# Patient Record
Sex: FEMALE
[De-identification: names, ages, dates, MRNs, and addresses within clinical notes are randomized per-mention and may not be internally consistent; named-entity substitution may affect disease eponyms.]

---

## 2018-11-07 NOTE — MRI
MRI RIGHT SHOULDER:

 

11/07/2018

 

PROVIDED CLINICAL HISTORY:

Right shoulder pain, status post injury.

 

FINDINGS:

Evaluation is limited by patient motion.

 

There is full-thickness partial retracted tearing involving the anterior supraspinatus tendon, distal
ly.  There is retraction to about the level of the acromion.  Partial-thickness under-surface tearing
 involving the distal fibers of infraspinatus cannot be excluded.  Partial-thickness interstitial tea
ring involving the cranial fibers of the subscapularis is suspected.  The teres minor appears intact.
  There is medial subluxation of the longhead biceps tendon, suggesting bicipital sling injury.  No f
rank dislocation.

 

There is a small glenohumeral joint effusion.  The glenoid labrum and glenohumeral articular cartilag
e are not optimally evaluated on the basis of this examination.

 

Susceptibility artifact is seen about the shoulder, suggesting prior surgical change.  Rotator cuff m
uscular volume appears preserved.  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
gnificant mass effect on the subjacent supraspinatus apparent.

 

There is greater than physiologic subacromial subdeltoid bursal fluid.

 

IMPRESSION:

1.  Limited study due to patient motion.  Full-thickness, partial-width tear of the supraspinatus ten
don with retraction.

 

2.  Partial-thickness tears of the infraspinatus and subscapularis are suspected.

 

3.  Medial subluxation of the longhead biceps tendon may reflect bicipital sling injury.

 

4.  Acromioclavicular joint osteoarthrosis.

 

POS: [facility]

## 2018-11-09 NOTE — RAD
RIGHT SHOULDER THREE VIEWS:

 

Indication: Pain, fall. 

 

FINDINGS: 

There is osteoarthritis without fracture or dislocation. 

 

IMPRESSION: 

No acute osseous abnormality right shoulder. 

 

POS: [facility]

## 2018-11-09 NOTE — RAD
3 VIEWS RIGHT WRIST:

 

Date:  11/09/18 

 

INDICATION:

Right wrist pain after fall. 

 

COMPARISON:  

None. 

 

FINDINGS:

There is diffuse osteopenia. No definite acute fracture or subluxation is evident. Carpal alignment a
ppears within normal limits. 

 

IMPRESSION: 

No acute osseous abnormality. 

 

 

POS: [facility]

## 2018-11-09 NOTE — RAD
2 VIEWS RIGHT FOREARM:

 

Date:  11/09/18 

 

INDICATION:

History of fall with right forearm pain. 

 

COMPARISON:  

None. 

 

FINDINGS/IMPRESSION: 

No acute fracture or subluxation is evident. Radiocapitellar alignment appears within normal limits. 
Soft tissues appear within normal limits. 

 

 

POS: [facility]

## 2018-12-05 NOTE — EKG
Test Reason : 

Blood Pressure : ***/*** mmHG

Vent. Rate : 065 BPM     Atrial Rate : 065 BPM

   P-R Int : 164 ms          QRS Dur : 084 ms

    QT Int : 408 ms       P-R-T Axes : 044 046 044 degrees

   QTc Int : 424 ms

 

Normal sinus rhythm

Normal ECG

When compared with ECG of 29-FEB-2016 13:47,

No significant change was found

Confirmed by ZURI BRUMFIELD ([id number]) on 12/5/2018 9:47:07 PM

 

Referred By:  JEANETTE           Confirmed By:ZURI BRUMFIELD

## 2018-12-07 NOTE — OP
DATE OF PROCEDURE:  12/07/2018



PREOPERATIVE DIAGNOSIS:  Rotator cuff tear and biceps dislocation, right shoulder.



POSTOPERATIVE DIAGNOSIS:  Rotator cuff tear and biceps dislocation, right shoulder

with leading edge of subscapularis tear. 



ASSISTANT:  Jos.



BLOOD LOSS:  Minimal.



SPECIMEN:  None.



DRAINS:  None.



COMPLICATION:  None.



TITLE OF PROCEDURE:  Right shoulder arthroscopic biceps tenotomy and arthroscopic

rotator cuff repair with arthroscopic decompression. 



DESCRIPTION OF PROCEDURE:  The patient was taken to the operating room, where

general anesthesia was induced.  The patient was placed in left lateral decubitus

position.  She received Ancef preoperatively.  Right arm was placed in traction,

prepped and draped in usual sterile fashion.  The scope was placed in the

glenohumeral joint.  The biceps tendon was unstable.  Due to the sling being

ruptured, I tagged the biceps and transected superior glenoid tubercle.  The biceps

only settled down into the groove, so I did not feel tenodesis was required.  She

has very soft bone.  I am concerned about possible iatrogenic fracture, so I left

this as a tenotomy.  Rotator cuff tear was mobilized and freshened.  It was fairly a

large tear.  You could see sutures from a previous repair. 

It ruptured the same place.  I freshened up the greater tuberosity with a latia.  I

performed an anterior and inferior acromioplasty with a latia.  Three Corkscrew

suture anchors were deployed, and sutures were passed through the rotator cuff and

tied in a very good approximation to the bleeding cancellous bone.  Shoulders were

then drained.  The portals were closed using nylon suture.  Sterile dressings were

applied. 





Job ID:  [id number]

## 2019-03-06 NOTE — RAD
CHEST ONE VIEW:

 

HISTORY:

Pain.

 

COMPARISON:

02/29/2016

 

FINDINGS:

Slight elongation of the aorta.  Normal cardiac silhouette.  Pulmonary vessels and hilum are normal. 
 Costophrenic angles are clear.  No mass.  No consolidation.  No pneumothorax or acute osseous abnorm
alities.

 

IMPRESSION:

No acute cardiopulmonary process.

 

POS: [facility]

## 2019-04-28 NOTE — RAD
Right forearm: 2 views



Indications fall with injury.



No evidence of fracture.



IMPRESSION: No acute abnormality.



Reported By: Carlos Gonzalez 

Electronically Signed:  4/28/2019 11:46 AM

## 2019-06-07 NOTE — MMO
Bilateral MAMMO Bilat Screen [facility].

 

CLINICAL HISTORY:

Patient is 79 years old and is seen for screening. The patient has the following

family history of breast cancer:  maternal aunt.  The patient has no personal

history of cancer.

 

VIEWS:

The views performed were:  bilateral craniocaudal with tomosynthesis and

bilateral mediolateral oblique with tomosynthesis.

 

FILMS COMPARED:

The present examination has been compared to prior imaging studies performed at

[facility] on 04/14/2014, 04/28/2015, 05/17/2016 and 05/18/2017.

 

MAMMOGRAM FINDINGS:

The breasts are heterogeneously dense, which could obscure a lesion on

mammography.

 

There are stable benign appearing calcifications seen in both breasts.

 

There are also vascular calcifications.

 

There are no suspicious masses, suspicious calcifications, or new areas of

architectural distortion.

 

IMPRESSION:

THERE IS NO MAMMOGRAPHIC EVIDENCE OF MALIGNANCY.

 

A ROUTINE FOLLOW-UP MAMMOGRAM IN 1 YEAR IS RECOMMENDED.

 

THE RESULTS OF THIS EXAM WERE SENT TO THE PATIENT.

 

ACR BI-RADS Category 2 - Benign finding

 

MAMMOGRAPHY NOTE:

 1. A negative mammogram report should not delay a biopsy if a dominant of

 clinically suspicious mass is present.

 2. Approximately 10% to 15% of breast cancers are not detected by

 mammography.

 3. Adenosis and dense breasts may obscure an underlying neoplasm.

## 2019-06-19 NOTE — MRI
MRI OF PELVIS PERFORMED WITHOUT CONTRAST ENHANCEMENT:

6/19/19

 

HISTORY: 

Left hip pain.

 

The SI joints are symmetric in appearance. There are no signs of any pelvic insufficiency type fractu
res. The visualized intrapelvic contents appear unremarkable. 

 

There are tendinosis changes of the hamstring tendon origins with a low grade partial tear at the rig
ht hamstring tendon origin. 

 

There are mild arthritic changes of the right hip demonstrated. 

 

Small field of view images of the left hip were performed. There is a left hip joint effusion present
. There is spur formation along the femoral head and neck junction. Some minimal edema change along t
he lateral femoral head and neck junction and some subtle subchondral marrow edema change of the hume
ral head which would suggest a small subchondral insufficiency fracture. There is considerable motion
 artifact on the sagittal views, but on the coronal images, there is degenerative type of tear involv
ing the hip labrum. 

 

There is also evidence of adductor muscle strain with edema changes near the  attachments of the addu
ctor brevis longus and minimus muscles to the inferior pubic ramus. 

 

IMPRESSION: 

1.      Adductor muscle strain. 

2.      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
ment. There is a degenerated appearance to the hip labrum. There is some subtle subchondral marrow ed
ema changes involving the superior weightbearing surface of the acetabulum which would suggest a smal
l subchondral insufficiency fracture. There is a moderate hip joint effusion associated with these fi
ndings. 

 

POS: [facility]

## 2019-11-15 NOTE — MRI
MRI LUMBAR SPINE NONCONTRAST:



HISTORY:

Lumbar radiculopathy.



COMPARISON:

7/29/2010.



FINDINGS:

Appropriate T1 marrow signal intensity of the lumbar vertebrae. Lumbar spine vertebral body height is
 maintained. No fracture. Type I Modic changes along the right aspect of the L3-L4 disc space. Mild

rightward curvature lumbar spine centered at L3-L4.



Appropriate signal intensity of the visualized paraspinal muscles. No retroperitoneal mass or hematom
a. Bilateral parapelvic cysts are noted

Conus medullaris terminates at the upper aspect of L1.



T12-L1:Adequate disc hydration. No significant central canal stenosis or significant neural foraminal
 narrowing.



L1-L2:Adequate disc hydration. No significant central canal stenosis. Left and right paracentral disc
 bulges, ligament flavum thickening and facet hypertrophy result in mild central canal stenosis.

Narrowing of the left subarticular zone with partial displacement of the traversing left L2 nerve christelle
t. Mild right neural foraminal narrowing. Moderate left foraminal narrowing.



L2-L3:Desiccation with moderate loss of disc space height. Broad-based disc bulge, ligament flavum th
ickening and facet hypertrophy result in moderate central canal stenosis. Moderate bilateral neural

foraminal narrowing.



L3-L4:Desiccation with severe loss of disc space height. Broad-based disc bulge, ligament flavum thic
kening and facet hypertrophy result in moderate to severe central canal stenosis. Severe narrowing

of the left subarticular zone with obscuration the traversing left L4 nerve root. Right neural forame
n is patent. Moderate left foraminal narrowing.



L4-L5:Desiccation with moderate loss of disc space height. Broad-based disc bulge with a small superi
or left subarticular disc protrusion. Mild narrowing of the left subarticular zone. There is

contact upon the left traversing L5 nerve root without significant obscuration. Moderate to severe bi
lateral foraminal narrowing.



L5-S1:Desiccation without significant loss of disc space height. Broad-based disc bulge, ligament fla
vum thickening and facet hypertrophy result in mild central canal stenosis. There is mild narrowing

of the right subarticular zone. Disc material makes contact upon the traversing right S1 nerve root. 
No significant displacement or obscuration. Severe right foraminal narrowing. Mild left foraminal

narrowing.



IMPRESSION:

Degenerative changes of the lumbar spine as described above.



Transcribed Date/Time: 11/15/2019 11:11 AM



Reported By: Leyda Weirally Signed:  11/15/2019 12:02 PM

## 2020-10-15 NOTE — MRI
Exam: MRI cervical spine without contrast



HISTORY: Spinal stenosis.



COMPARISON: 9/9/2010



FINDINGS:

 Appropriate T1 marrow signal intensity of the cervical vertebra. Cervical spine vertebral body heigh
ts are maintained. No fracture. No significant STIR hyperintensity to suggest vertebral body edema

or ligamentous injury. There are type I Modic changes at C6-C7.

Visualized brain parenchyma, cervicomedullary junction, cervical cord and the upper thoracic cord hav
e a normal size and signal intensity.



Spondylolisthesis:

C3-C4: 2.2 mm of anterolisthesis.

C4-C5: 1.9 mm of anterolisthesis.

C6-C7: 1.6 mm of retrolisthesis.



C2-C3: Central disc herniation contacts the ventral cord. Mild central canal stenosis. No cord signal
 abnormality. Right neural foramen is patent. Moderate left neural foraminal narrowing

predominantly due to facet hypertrophy.



C3-C4: Central disc herniation deforms the cervical cord. Moderate central canal stenosis without cor
d signal abnormality. Moderate right foraminal narrowing due to uncovertebral hypertrophy. Patent

left neural foramen.



C4-C5: Disc desiccation with moderate loss of disc space height. Broad-based disc osteophyte complex 
abuts the thecal sac. There is mass effect upon the midline cervical cord, without cord signal

abnormality. Mild central canal stenosis. Patent bilateral neural foramina.



C5-C6: Disc desiccation with moderate loss of disc space height. Broad-based disc osteophyte complex 
with moderate to severe central canal stenosis. No cord signal abnormality. Moderate bilateral

neural foraminal narrowing.



C6-C7: Broad-based disc osteophyte complex with central disc herniation. There is inferior and superi
or disc migration. Moderate central canal stenosis. Moderate to severe right and moderate left

neural foraminal narrowing due to uncovertebral hypertrophy.



C7-T1: No significant posterior disc abnormality. No significant central canal stenosis. Mild bilater
al neural foraminal narrowing.



Additional findings: There does appear to be prominent disc osteophyte complexes/disc herniation at T
2-T3 and T3-T4. Evaluation is incomplete. Dedicated thoracic spine MRI recommended.



IMPRESSION:

 Multilevel degenerative changes of the cervical spine as detailed above.



Transcribed Date/Time: 10/15/2020 1:35 PM



Reported By: Leyda Vela 

Electronically Signed:  10/15/2020 2:28 PM

## 2021-02-23 NOTE — RAD
EXAM: 2 views of the left hip



HISTORY: Status post left hip arthroplasty 



COMPARISON: 9/9/2010



FINDINGS: 2 views of the left hip shows the patient is status post left hip arthroplasty without abhilash
hardware lucency or fracture. Air in the soft tissues is from recent surgery.



IMPRESSION: Status post left hip arthroplasty without evidence of complication.



Reported By: Vincent Huggins 

Electronically Signed:  2/23/2021 9:28 AM

## 2021-04-10 ENCOUNTER — HOSPITAL ENCOUNTER (EMERGENCY)
Dept: HOSPITAL 92 - ERS | Age: 81
Discharge: HOME | End: 2021-04-10
Payer: MEDICARE

## 2021-04-10 DIAGNOSIS — Z79.899: ICD-10-CM

## 2021-04-10 DIAGNOSIS — D64.9: ICD-10-CM

## 2021-04-10 DIAGNOSIS — R51.9: ICD-10-CM
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
## 2021-04-10 DIAGNOSIS — K21.9: ICD-10-CM

## 2021-04-10 DIAGNOSIS — M25.531: ICD-10-CM

## 2021-04-10 DIAGNOSIS — S20.312A: Primary | ICD-10-CM

## 2021-04-10 DIAGNOSIS — E03.9: ICD-10-CM

## 2021-04-10 DIAGNOSIS — I10: ICD-10-CM

## 2021-04-10 LAB
ANION GAP SERPL CALC-SCNC: 14 MMOL/L (ref 10–20)
BUN SERPL-MCNC: 27 MG/DL (ref 9.8–20.1)
CALCIUM SERPL-MCNC: 9.6 MG/DL (ref 7.8–10.44)
CHLORIDE SERPL-SCNC: 103 MMOL/L (ref 98–107)
CO2 SERPL-SCNC: 29 MMOL/L (ref 23–31)
CREAT CL PREDICTED SERPL C-G-VRATE: 0 ML/MIN (ref 70–130)
GLUCOSE SERPL-MCNC: 103 MG/DL (ref 83–110)
HGB BLD-MCNC: 11.2 G/DL (ref 12–16)
MCH RBC QN AUTO: 29.5 PG (ref 27–31)
MCV RBC AUTO: 92.4 FL (ref 78–98)
MDIFF COMPLETE?: YES
PLATELET # BLD AUTO: (no result) THOU/UL (ref 130–400)
POTASSIUM SERPL-SCNC: 4.4 MMOL/L (ref 3.5–5.1)
RBC # BLD AUTO: 3.81 MILL/UL (ref 4.2–5.4)
SODIUM SERPL-SCNC: 142 MMOL/L (ref 136–145)
WBC # BLD AUTO: 7.9 THOU/UL (ref 4.8–10.8)

## 2021-04-10 PROCEDURE — 72125 CT NECK SPINE W/O DYE: CPT

## 2021-04-10 PROCEDURE — 36415 COLL VENOUS BLD VENIPUNCTURE: CPT

## 2021-04-10 PROCEDURE — 80048 BASIC METABOLIC PNL TOTAL CA: CPT

## 2021-04-10 PROCEDURE — 70450 CT HEAD/BRAIN W/O DYE: CPT

## 2021-04-10 PROCEDURE — 93005 ELECTROCARDIOGRAM TRACING: CPT

## 2021-04-10 PROCEDURE — 84484 ASSAY OF TROPONIN QUANT: CPT

## 2021-04-10 PROCEDURE — 85025 COMPLETE CBC W/AUTO DIFF WBC: CPT

## 2021-04-14 ENCOUNTER — HOSPITAL ENCOUNTER (OUTPATIENT)
Dept: HOSPITAL 92 - BICRAD | Age: 81
Discharge: HOME | End: 2021-04-14
Attending: FAMILY MEDICINE
Payer: MEDICARE

## 2021-04-14 DIAGNOSIS — M25.552: Primary | ICD-10-CM

## 2021-04-15 ENCOUNTER — HOSPITAL ENCOUNTER (OUTPATIENT)
Dept: HOSPITAL 92 - BICMAMMO | Age: 81
Discharge: HOME | End: 2021-04-15
Attending: INTERNAL MEDICINE
Payer: MEDICARE

## 2021-04-15 DIAGNOSIS — Z80.3: ICD-10-CM

## 2021-04-15 DIAGNOSIS — Z12.31: Primary | ICD-10-CM

## 2021-04-15 DIAGNOSIS — M85.89: ICD-10-CM

## 2021-04-15 DIAGNOSIS — M81.0: ICD-10-CM

## 2021-04-15 PROCEDURE — 77067 SCR MAMMO BI INCL CAD: CPT

## 2021-04-15 PROCEDURE — 77080 DXA BONE DENSITY AXIAL: CPT

## 2021-04-15 PROCEDURE — 77063 BREAST TOMOSYNTHESIS BI: CPT

## 2021-04-20 ENCOUNTER — HOSPITAL ENCOUNTER (EMERGENCY)
Dept: HOSPITAL 92 - ERS | Age: 81
Discharge: LEFT BEFORE DISCHARGE | End: 2021-04-20
Payer: MEDICARE

## 2021-04-20 DIAGNOSIS — Z53.21: Primary | ICD-10-CM

## 2021-09-16 ENCOUNTER — HOSPITAL ENCOUNTER (EMERGENCY)
Dept: HOSPITAL 92 - ERS | Age: 81
Discharge: HOME | End: 2021-09-16
Payer: MEDICARE

## 2021-09-16 DIAGNOSIS — I10: ICD-10-CM

## 2021-09-16 DIAGNOSIS — I62.01: Primary | ICD-10-CM

## 2021-09-16 DIAGNOSIS — Z79.899: ICD-10-CM

## 2021-09-16 DIAGNOSIS — I62.03: ICD-10-CM

## 2021-09-16 LAB
ALBUMIN SERPL BCG-MCNC: 4 G/DL (ref 3.4–4.8)
ALP SERPL-CCNC: 55 U/L (ref 40–110)
ALT SERPL W P-5'-P-CCNC: 8 U/L (ref 8–55)
ANION GAP SERPL CALC-SCNC: 10 MMOL/L (ref 10–20)
APTT PPP: 34.4 SEC (ref 22.9–36.1)
AST SERPL-CCNC: 17 U/L (ref 5–34)
BASOPHILS # BLD AUTO: 0 THOU/UL (ref 0–0.2)
BASOPHILS NFR BLD AUTO: 0.7 % (ref 0–1)
BILIRUB SERPL-MCNC: 0.4 MG/DL (ref 0.2–1.2)
BUN SERPL-MCNC: 25 MG/DL (ref 9.8–20.1)
CALCIUM SERPL-MCNC: 9.2 MG/DL (ref 7.8–10.44)
CHLORIDE SERPL-SCNC: 107 MMOL/L (ref 98–107)
CO2 SERPL-SCNC: 27 MMOL/L (ref 23–31)
CREAT CL PREDICTED SERPL C-G-VRATE: 0 ML/MIN (ref 70–130)
EOSINOPHIL # BLD AUTO: 0.2 THOU/UL (ref 0–0.7)
EOSINOPHIL NFR BLD AUTO: 3.2 % (ref 0–10)
GLOBULIN SER CALC-MCNC: 2.2 G/DL (ref 2.4–3.5)
GLUCOSE SERPL-MCNC: 95 MG/DL (ref 83–110)
HGB BLD-MCNC: 10.9 G/DL (ref 12–16)
INR PPP: 1
LIPASE SERPL-CCNC: 24 U/L (ref 8–78)
LYMPHOCYTES # BLD: 1.6 THOU/UL (ref 1.2–3.4)
LYMPHOCYTES NFR BLD AUTO: 29.1 % (ref 21–51)
MCH RBC QN AUTO: 33.3 PG (ref 27–31)
MCV RBC AUTO: 96.6 FL (ref 78–98)
MONOCYTES # BLD AUTO: 0.5 THOU/UL (ref 0.11–0.59)
MONOCYTES NFR BLD AUTO: 9.7 % (ref 0–10)
NEUTROPHILS # BLD AUTO: 3.1 THOU/UL (ref 1.4–6.5)
NEUTROPHILS NFR BLD AUTO: 57.3 % (ref 42–75)
PLATELET # BLD AUTO: 141 THOU/UL (ref 130–400)
POTASSIUM SERPL-SCNC: 4 MMOL/L (ref 3.5–5.1)
PROTHROMBIN TIME: 12.9 SEC (ref 12–14.7)
RBC # BLD AUTO: 3.28 MILL/UL (ref 4.2–5.4)
SODIUM SERPL-SCNC: 140 MMOL/L (ref 136–145)
WBC # BLD AUTO: 5.4 THOU/UL (ref 4.8–10.8)

## 2021-09-16 PROCEDURE — 96374 THER/PROPH/DIAG INJ IV PUSH: CPT

## 2021-09-16 PROCEDURE — 36415 COLL VENOUS BLD VENIPUNCTURE: CPT

## 2021-09-16 PROCEDURE — 70450 CT HEAD/BRAIN W/O DYE: CPT

## 2021-09-16 PROCEDURE — 85025 COMPLETE CBC W/AUTO DIFF WBC: CPT

## 2021-09-16 PROCEDURE — 93005 ELECTROCARDIOGRAM TRACING: CPT

## 2021-09-16 PROCEDURE — 84484 ASSAY OF TROPONIN QUANT: CPT

## 2021-09-16 PROCEDURE — 83690 ASSAY OF LIPASE: CPT

## 2021-09-16 PROCEDURE — 80053 COMPREHEN METABOLIC PANEL: CPT

## 2021-09-16 PROCEDURE — 85730 THROMBOPLASTIN TIME PARTIAL: CPT

## 2021-09-16 PROCEDURE — 85610 PROTHROMBIN TIME: CPT

## 2021-10-01 ENCOUNTER — HOSPITAL ENCOUNTER (EMERGENCY)
Dept: HOSPITAL 92 - ERS | Age: 81
Discharge: HOME | End: 2021-10-01
Payer: MEDICARE

## 2021-10-01 DIAGNOSIS — I10: ICD-10-CM

## 2021-10-01 DIAGNOSIS — Z79.899: ICD-10-CM

## 2021-10-01 DIAGNOSIS — M81.0: ICD-10-CM

## 2021-10-01 DIAGNOSIS — I62.01: Primary | ICD-10-CM

## 2021-10-01 PROCEDURE — 70450 CT HEAD/BRAIN W/O DYE: CPT

## 2021-12-30 ENCOUNTER — HOSPITAL ENCOUNTER (EMERGENCY)
Dept: HOSPITAL 92 - ERS | Age: 81
Discharge: HOME | End: 2021-12-30
Payer: MEDICARE

## 2021-12-30 DIAGNOSIS — M54.50: Primary | ICD-10-CM

## 2021-12-30 DIAGNOSIS — I10: ICD-10-CM

## 2022-01-13 ENCOUNTER — HOSPITAL ENCOUNTER (OUTPATIENT)
Dept: HOSPITAL 92 - BICCT | Age: 82
Discharge: HOME | End: 2022-01-13
Attending: NEUROLOGICAL SURGERY
Payer: MEDICARE

## 2022-01-13 DIAGNOSIS — I62.00: Primary | ICD-10-CM

## 2022-01-13 PROCEDURE — 70450 CT HEAD/BRAIN W/O DYE: CPT

## 2022-04-21 ENCOUNTER — HOSPITAL ENCOUNTER (OUTPATIENT)
Dept: HOSPITAL 92 - BICMAMMO | Age: 82
Discharge: HOME | End: 2022-04-21
Attending: FAMILY MEDICINE
Payer: MEDICARE

## 2022-04-21 DIAGNOSIS — Z12.31: Primary | ICD-10-CM

## 2022-04-21 DIAGNOSIS — Z80.3: ICD-10-CM

## 2022-04-21 PROCEDURE — 77067 SCR MAMMO BI INCL CAD: CPT

## 2022-04-21 PROCEDURE — 77063 BREAST TOMOSYNTHESIS BI: CPT

## 2022-11-30 ENCOUNTER — HOSPITAL ENCOUNTER (EMERGENCY)
Dept: HOSPITAL 92 - ERS | Age: 82
Discharge: HOME | End: 2022-11-30
Payer: MEDICARE

## 2022-11-30 DIAGNOSIS — M25.512: ICD-10-CM

## 2022-11-30 DIAGNOSIS — R07.89: Primary | ICD-10-CM

## 2022-11-30 DIAGNOSIS — I10: ICD-10-CM

## 2022-11-30 LAB
ALBUMIN SERPL BCG-MCNC: 4.1 G/DL (ref 3.4–4.8)
ALP SERPL-CCNC: 50 U/L (ref 40–110)
ALT SERPL W P-5'-P-CCNC: 14 U/L (ref 8–55)
ANION GAP SERPL CALC-SCNC: 12 MMOL/L (ref 10–20)
AST SERPL-CCNC: 19 U/L (ref 5–34)
BASOPHILS # BLD AUTO: 0 THOU/UL (ref 0–0.2)
BASOPHILS NFR BLD AUTO: 0.4 % (ref 0–1)
BILIRUB SERPL-MCNC: 0.8 MG/DL (ref 0.2–1.2)
BUN SERPL-MCNC: 21 MG/DL (ref 9.8–20.1)
CALCIUM SERPL-MCNC: 9.4 MG/DL (ref 7.8–10.44)
CHLORIDE SERPL-SCNC: 103 MMOL/L (ref 98–107)
CO2 SERPL-SCNC: 27 MMOL/L (ref 23–31)
CREAT CL PREDICTED SERPL C-G-VRATE: 0 ML/MIN (ref 70–130)
EOSINOPHIL # BLD AUTO: 0.2 THOU/UL (ref 0–0.7)
EOSINOPHIL NFR BLD AUTO: 3.2 % (ref 0–10)
GLOBULIN SER CALC-MCNC: 2.4 G/DL (ref 2.4–3.5)
GLUCOSE SERPL-MCNC: 89 MG/DL (ref 83–110)
HGB BLD-MCNC: 11.8 G/DL (ref 12–16)
LIPASE SERPL-CCNC: 21 U/L (ref 8–78)
LYMPHOCYTES # BLD: 1.3 THOU/UL (ref 1.2–3.4)
LYMPHOCYTES NFR BLD AUTO: 24.8 % (ref 21–51)
MCH RBC QN AUTO: 33.3 PG (ref 27–31)
MCV RBC AUTO: 102 FL (ref 78–98)
MONOCYTES # BLD AUTO: 0.5 THOU/UL (ref 0.11–0.59)
MONOCYTES NFR BLD AUTO: 8.4 % (ref 0–10)
NEUTROPHILS # BLD AUTO: 3.4 THOU/UL (ref 1.4–6.5)
NEUTROPHILS NFR BLD AUTO: 63.2 % (ref 42–75)
PLATELET # BLD AUTO: 128 10X3/UL (ref 130–400)
POTASSIUM SERPL-SCNC: 4.5 MMOL/L (ref 3.5–5.1)
RBC # BLD AUTO: 3.54 MILL/UL (ref 4.2–5.4)
SODIUM SERPL-SCNC: 137 MMOL/L (ref 136–145)
WBC # BLD AUTO: 5.3 10X3/UL (ref 4.8–10.8)

## 2022-11-30 PROCEDURE — 93005 ELECTROCARDIOGRAM TRACING: CPT

## 2022-11-30 PROCEDURE — 85025 COMPLETE CBC W/AUTO DIFF WBC: CPT

## 2022-11-30 PROCEDURE — 80053 COMPREHEN METABOLIC PANEL: CPT

## 2022-11-30 PROCEDURE — 83690 ASSAY OF LIPASE: CPT

## 2022-11-30 PROCEDURE — 71045 X-RAY EXAM CHEST 1 VIEW: CPT

## 2022-11-30 PROCEDURE — 84484 ASSAY OF TROPONIN QUANT: CPT

## 2022-11-30 PROCEDURE — 36415 COLL VENOUS BLD VENIPUNCTURE: CPT

## 2022-11-30 PROCEDURE — 83880 ASSAY OF NATRIURETIC PEPTIDE: CPT

## 2023-06-13 ENCOUNTER — HOSPITAL ENCOUNTER (OUTPATIENT)
Dept: HOSPITAL 92 - BICMAMMO | Age: 83
Discharge: HOME | End: 2023-06-13
Attending: INTERNAL MEDICINE
Payer: MEDICARE

## 2023-06-13 DIAGNOSIS — M85.80: ICD-10-CM

## 2023-06-13 DIAGNOSIS — M81.0: Primary | ICD-10-CM

## 2023-06-13 PROCEDURE — 77080 DXA BONE DENSITY AXIAL: CPT

## 2023-07-25 ENCOUNTER — HOSPITAL ENCOUNTER (OUTPATIENT)
Dept: HOSPITAL 92 - LABBT | Age: 83
Discharge: HOME | End: 2023-07-25
Attending: ORTHOPAEDIC SURGERY
Payer: MEDICARE

## 2023-07-25 DIAGNOSIS — M19.011: ICD-10-CM

## 2023-07-25 DIAGNOSIS — Z01.818: Primary | ICD-10-CM

## 2023-07-25 LAB
ANION GAP SERPL CALC-SCNC: 15 MMOL/L (ref 10–20)
BASOPHILS # BLD AUTO: 0 10X3/UL (ref 0–0.2)
BASOPHILS NFR BLD AUTO: 0.6 % (ref 0–2)
BUN SERPL-MCNC: 20 MG/DL (ref 9.8–20.1)
CALCIUM SERPL-MCNC: 9.4 MG/DL (ref 7.8–10.44)
CHLORIDE SERPL-SCNC: 107 MMOL/L (ref 98–107)
CO2 SERPL-SCNC: 24 MMOL/L (ref 23–31)
CREAT CL PREDICTED SERPL C-G-VRATE: 0 ML/MIN (ref 70–130)
EOSINOPHIL # BLD AUTO: 0.2 10X3/UL (ref 0–0.5)
EOSINOPHIL NFR BLD AUTO: 3.5 % (ref 0–6)
GIANT PLATELETS BLD QL SMEAR: SLIGHT HPF (ref 0–5)
GLUCOSE SERPL-MCNC: 76 MG/DL (ref 83–110)
HGB BLD-MCNC: 11.3 G/DL (ref 12–15.5)
LYMPHOCYTES NFR BLD AUTO: 30.2 % (ref 18–47)
MCH RBC QN AUTO: 31.3 PG (ref 27–33)
MCV RBC AUTO: 97.8 FL (ref 81.6–98.3)
MONOCYTES # BLD AUTO: 0.5 10X3/UL (ref 0–1.1)
MONOCYTES NFR BLD AUTO: 10.8 % (ref 0–10)
NEUTROPHILS # BLD AUTO: 2.7 10X3/UL (ref 1.5–8.4)
NEUTROPHILS NFR BLD AUTO: 54.5 % (ref 40–75)
PLATELET # BLD AUTO: (no result) 10X3/UL (ref 150–450)
PLATELET BLD QL SMEAR: (no result)
POTASSIUM SERPL-SCNC: 4.4 MMOL/L (ref 3.5–5.1)
RBC # BLD AUTO: 3.61 10X6/UL (ref 3.9–5.03)
SODIUM SERPL-SCNC: 142 MMOL/L (ref 136–145)
WBC # BLD AUTO: 4.9 10X3/UL (ref 3.5–10.5)

## 2023-07-25 PROCEDURE — 93005 ELECTROCARDIOGRAM TRACING: CPT

## 2023-07-25 PROCEDURE — 80048 BASIC METABOLIC PNL TOTAL CA: CPT

## 2023-07-25 PROCEDURE — 71046 X-RAY EXAM CHEST 2 VIEWS: CPT

## 2023-07-25 PROCEDURE — 93010 ELECTROCARDIOGRAM REPORT: CPT

## 2023-07-25 PROCEDURE — 85025 COMPLETE CBC W/AUTO DIFF WBC: CPT

## 2023-07-27 ENCOUNTER — HOSPITAL ENCOUNTER (OUTPATIENT)
Dept: HOSPITAL 92 - SDC | Age: 83
Setting detail: OBSERVATION
LOS: 1 days | Discharge: HOME | End: 2023-07-28
Attending: ORTHOPAEDIC SURGERY | Admitting: ORTHOPAEDIC SURGERY
Payer: MEDICARE

## 2023-07-27 VITALS — BODY MASS INDEX: 246381.7 KG/M2

## 2023-07-27 DIAGNOSIS — E78.00: ICD-10-CM

## 2023-07-27 DIAGNOSIS — Z88.5: ICD-10-CM

## 2023-07-27 DIAGNOSIS — M81.0: ICD-10-CM

## 2023-07-27 DIAGNOSIS — Z88.6: ICD-10-CM

## 2023-07-27 DIAGNOSIS — I10: ICD-10-CM

## 2023-07-27 DIAGNOSIS — M19.011: Primary | ICD-10-CM

## 2023-07-27 DIAGNOSIS — Z90.89: ICD-10-CM

## 2023-07-27 DIAGNOSIS — M75.102: ICD-10-CM

## 2023-07-27 DIAGNOSIS — Z90.49: ICD-10-CM

## 2023-07-27 DIAGNOSIS — Z88.8: ICD-10-CM

## 2023-07-27 PROCEDURE — 0RPJ0JZ REMOVAL OF SYNTHETIC SUBSTITUTE FROM RIGHT SHOULDER JOINT, OPEN APPROACH: ICD-10-PCS | Performed by: ORTHOPAEDIC SURGERY

## 2023-07-27 PROCEDURE — 97535 SELF CARE MNGMENT TRAINING: CPT

## 2023-07-27 PROCEDURE — A4306 DRUG DELIVERY SYSTEM <=50 ML: HCPCS

## 2023-07-27 PROCEDURE — 97110 THERAPEUTIC EXERCISES: CPT

## 2023-07-27 PROCEDURE — C1713 ANCHOR/SCREW BN/BN,TIS/BN: HCPCS

## 2023-07-27 PROCEDURE — C1776 JOINT DEVICE (IMPLANTABLE): HCPCS

## 2023-07-27 PROCEDURE — 23430 REPAIR BICEPS TENDON: CPT

## 2023-07-27 PROCEDURE — 23472 RECONSTRUCT SHOULDER JOINT: CPT

## 2023-07-27 PROCEDURE — 97530 THERAPEUTIC ACTIVITIES: CPT

## 2023-07-27 PROCEDURE — 0RRJ0JZ REPLACEMENT OF RIGHT SHOULDER JOINT WITH SYNTHETIC SUBSTITUTE, OPEN APPROACH: ICD-10-PCS | Performed by: ORTHOPAEDIC SURGERY

## 2023-07-27 PROCEDURE — 97116 GAIT TRAINING THERAPY: CPT

## 2023-07-27 RX ADMIN — Medication SCH ML: at 21:08

## 2023-07-28 VITALS — SYSTOLIC BLOOD PRESSURE: 132 MMHG | TEMPERATURE: 97.9 F | DIASTOLIC BLOOD PRESSURE: 58 MMHG

## 2023-07-28 RX ADMIN — Medication SCH: at 09:13

## 2023-11-01 ENCOUNTER — HOSPITAL ENCOUNTER (OUTPATIENT)
Dept: HOSPITAL 92 - SCSRAD | Age: 83
Discharge: HOME | End: 2023-11-01
Attending: FAMILY MEDICINE
Payer: MEDICARE

## 2023-11-01 DIAGNOSIS — R50.9: Primary | ICD-10-CM

## 2023-11-01 PROCEDURE — 71046 X-RAY EXAM CHEST 2 VIEWS: CPT

## 2023-12-28 ENCOUNTER — HOSPITAL ENCOUNTER (EMERGENCY)
Dept: HOSPITAL 92 - ERS | Age: 83
Discharge: HOME | End: 2023-12-28
Payer: MEDICARE

## 2023-12-28 DIAGNOSIS — N39.0: Primary | ICD-10-CM

## 2023-12-28 LAB
ALBUMIN SERPL BCG-MCNC: 4.3 G/DL (ref 3.4–4.8)
ALP SERPL-CCNC: 82 U/L (ref 40–110)
ALT SERPL W P-5'-P-CCNC: 10 U/L (ref 8–55)
ANION GAP SERPL CALC-SCNC: 12 MMOL/L (ref 10–20)
AST SERPL-CCNC: 15 U/L (ref 5–34)
BACTERIA UR QL AUTO: (no result) HPF
BASOPHILS # BLD AUTO: 0 THOU/UL (ref 0–0.2)
BASOPHILS NFR BLD AUTO: 0.2 % (ref 0–1)
BILIRUB SERPL-MCNC: 0.7 MG/DL (ref 0.2–1.2)
BUN SERPL-MCNC: 24 MG/DL (ref 9.8–20.1)
CALCIUM SERPL-MCNC: 9.2 MG/DL (ref 7.8–10.44)
CAUTI INDICATIONS FOR CULTURE: (no result)
CHLORIDE SERPL-SCNC: 108 MMOL/L (ref 98–107)
CO2 SERPL-SCNC: 28 MMOL/L (ref 23–31)
CREAT CL PREDICTED SERPL C-G-VRATE: 0 ML/MIN (ref 70–130)
EOSINOPHIL # BLD AUTO: 0.2 THOU/UL (ref 0–0.7)
EOSINOPHIL NFR BLD AUTO: 2 % (ref 0–10)
GLOBULIN SER CALC-MCNC: 2.4 G/DL (ref 2.4–3.5)
GLUCOSE SERPL-MCNC: 101 MG/DL (ref 83–110)
HCT VFR BLD CALC: 34.6 % (ref 36–47)
HGB BLD-MCNC: 10.9 G/DL (ref 12–16)
LYMPHOCYTES NFR BLD AUTO: 20 % (ref 21–51)
MCH RBC QN AUTO: 31.1 PG (ref 27–31)
MCV RBC AUTO: 98.6 FL (ref 78–98)
MONOCYTES # BLD AUTO: 0.8 THOU/UL (ref 0.11–0.59)
MONOCYTES NFR BLD AUTO: 9.5 % (ref 0–10)
NEUTROPHILS # BLD AUTO: 5.9 THOU/UL (ref 1.4–6.5)
NEUTROPHILS NFR BLD AUTO: 67.8 % (ref 42–75)
PLATELET # BLD AUTO: 249 10X3/UL (ref 130–400)
POTASSIUM SERPL-SCNC: 3.8 MMOL/L (ref 3.5–5.1)
RBC # BLD AUTO: 3.51 MILL/UL (ref 4.2–5.4)
RBC UR QL AUTO: (no result) HPF (ref 0–3)
SODIUM SERPL-SCNC: 144 MMOL/L (ref 136–145)
SP GR UR STRIP: 1.02 (ref 1–1.03)
TROPONIN I SERPL DL<=0.01 NG/ML-MCNC: (no result) NG/ML (ref ?–0.03)
WBC # BLD AUTO: 8.6 10X3/UL (ref 4.8–10.8)

## 2023-12-28 PROCEDURE — 87077 CULTURE AEROBIC IDENTIFY: CPT

## 2023-12-28 PROCEDURE — 36415 COLL VENOUS BLD VENIPUNCTURE: CPT

## 2023-12-28 PROCEDURE — 84484 ASSAY OF TROPONIN QUANT: CPT

## 2023-12-28 PROCEDURE — 93005 ELECTROCARDIOGRAM TRACING: CPT

## 2023-12-28 PROCEDURE — 80053 COMPREHEN METABOLIC PANEL: CPT

## 2023-12-28 PROCEDURE — 87086 URINE CULTURE/COLONY COUNT: CPT

## 2023-12-28 PROCEDURE — 94760 N-INVAS EAR/PLS OXIMETRY 1: CPT

## 2023-12-28 PROCEDURE — 85025 COMPLETE CBC W/AUTO DIFF WBC: CPT

## 2023-12-28 PROCEDURE — 87186 SC STD MICRODIL/AGAR DIL: CPT

## 2023-12-28 PROCEDURE — 71045 X-RAY EXAM CHEST 1 VIEW: CPT

## 2023-12-28 PROCEDURE — 72100 X-RAY EXAM L-S SPINE 2/3 VWS: CPT

## 2023-12-28 PROCEDURE — 70450 CT HEAD/BRAIN W/O DYE: CPT

## 2023-12-28 PROCEDURE — 81001 URINALYSIS AUTO W/SCOPE: CPT

## 2025-02-08 ENCOUNTER — HOSPITAL ENCOUNTER (EMERGENCY)
Dept: HOSPITAL 92 - ERS | Age: 85
Discharge: HOME | End: 2025-02-08
Payer: MEDICARE

## 2025-02-08 DIAGNOSIS — I10: ICD-10-CM

## 2025-02-08 DIAGNOSIS — M54.50: Primary | ICD-10-CM

## 2025-02-08 DIAGNOSIS — M25.552: ICD-10-CM

## 2025-02-08 PROCEDURE — 96372 THER/PROPH/DIAG INJ SC/IM: CPT

## 2025-02-08 PROCEDURE — 72131 CT LUMBAR SPINE W/O DYE: CPT

## 2025-02-08 PROCEDURE — 73030 X-RAY EXAM OF SHOULDER: CPT

## 2025-02-08 PROCEDURE — 73502 X-RAY EXAM HIP UNI 2-3 VIEWS: CPT
